# Patient Record
Sex: MALE | Race: WHITE
[De-identification: names, ages, dates, MRNs, and addresses within clinical notes are randomized per-mention and may not be internally consistent; named-entity substitution may affect disease eponyms.]

---

## 2017-03-22 ENCOUNTER — HOSPITAL ENCOUNTER (OUTPATIENT)
Dept: HOSPITAL 56 - MW.CHRC | Age: 37
End: 2017-03-22
Attending: FAMILY MEDICINE
Payer: COMMERCIAL

## 2017-03-22 DIAGNOSIS — R07.9: Primary | ICD-10-CM

## 2017-07-24 ENCOUNTER — HOSPITAL ENCOUNTER (OUTPATIENT)
Dept: HOSPITAL 56 - MW.ED | Age: 37
Setting detail: OBSERVATION
LOS: 1 days | Discharge: HOME | End: 2017-07-25
Attending: INTERNAL MEDICINE | Admitting: INTERNAL MEDICINE
Payer: COMMERCIAL

## 2017-07-24 DIAGNOSIS — R10.12: ICD-10-CM

## 2017-07-24 DIAGNOSIS — K29.70: ICD-10-CM

## 2017-07-24 DIAGNOSIS — Z79.899: ICD-10-CM

## 2017-07-24 DIAGNOSIS — F41.8: ICD-10-CM

## 2017-07-24 DIAGNOSIS — R07.89: Primary | ICD-10-CM

## 2017-07-24 DIAGNOSIS — F10.10: ICD-10-CM

## 2017-07-24 LAB
CHLORIDE SERPL-SCNC: 102 MMOL/L (ref 98–110)
SODIUM SERPL-SCNC: 139 MMOL/L (ref 136–146)

## 2017-07-24 PROCEDURE — 85025 COMPLETE CBC W/AUTO DIFF WBC: CPT

## 2017-07-24 PROCEDURE — 80053 COMPREHEN METABOLIC PANEL: CPT

## 2017-07-24 PROCEDURE — G0480 DRUG TEST DEF 1-7 CLASSES: HCPCS

## 2017-07-24 PROCEDURE — G0378 HOSPITAL OBSERVATION PER HR: HCPCS

## 2017-07-24 PROCEDURE — 71010: CPT

## 2017-07-24 PROCEDURE — 96374 THER/PROPH/DIAG INJ IV PUSH: CPT

## 2017-07-24 PROCEDURE — 93005 ELECTROCARDIOGRAM TRACING: CPT

## 2017-07-24 PROCEDURE — 84484 ASSAY OF TROPONIN QUANT: CPT

## 2017-07-24 PROCEDURE — 83690 ASSAY OF LIPASE: CPT

## 2017-07-24 PROCEDURE — 83036 HEMOGLOBIN GLYCOSYLATED A1C: CPT

## 2017-07-24 PROCEDURE — 80061 LIPID PANEL: CPT

## 2017-07-24 PROCEDURE — C9113 INJ PANTOPRAZOLE SODIUM, VIA: HCPCS

## 2017-07-24 PROCEDURE — 99285 EMERGENCY DEPT VISIT HI MDM: CPT

## 2017-07-24 PROCEDURE — 96375 TX/PRO/DX INJ NEW DRUG ADDON: CPT

## 2017-07-24 PROCEDURE — 36415 COLL VENOUS BLD VENIPUNCTURE: CPT

## 2017-07-24 PROCEDURE — 96361 HYDRATE IV INFUSION ADD-ON: CPT

## 2017-07-24 PROCEDURE — 96376 TX/PRO/DX INJ SAME DRUG ADON: CPT

## 2017-07-24 RX ADMIN — SUCRALFATE SCH GM: 1 SUSPENSION ORAL at 12:02

## 2017-07-24 RX ADMIN — SODIUM CHLORIDE SCH MG: 9 INJECTION, SOLUTION INTRAVENOUS at 11:57

## 2017-07-24 RX ADMIN — SODIUM CHLORIDE SCH MLS/HR: 9 INJECTION, SOLUTION INTRAMUSCULAR; INTRAVENOUS; SUBCUTANEOUS at 11:58

## 2017-07-24 RX ADMIN — FOLIC ACID SCH MG: 5 INJECTION, SOLUTION INTRAMUSCULAR; INTRAVENOUS; SUBCUTANEOUS at 11:59

## 2017-07-24 RX ADMIN — SUCRALFATE SCH GM: 1 SUSPENSION ORAL at 20:58

## 2017-07-24 RX ADMIN — SODIUM CHLORIDE SCH MLS/HR: 9 INJECTION, SOLUTION INTRAMUSCULAR; INTRAVENOUS; SUBCUTANEOUS at 23:54

## 2017-07-24 RX ADMIN — SUCRALFATE SCH GM: 1 SUSPENSION ORAL at 16:34

## 2017-07-24 NOTE — EDM.PDOC
[id number]   6d


CHEST PAIN


Time Seen by Provider: 07/24/17 08:25


Source of Information: Reports: Patient


History Limitations: Reports: No Limitations





- History of Present Illness


INITIAL COMMENTS - FREE TEXT/NARRATIVE: 





History of present illness:


[]Patient states he is an alcoholic and has been drinking heavily for the last 

2 months. This morning at 4:00 he woke up with epigastric pain radiating to his 

chest he states he "thinks" he had left arm tingling but states he probably was 

imagining this as he was working himself up into an anxiety attack. Patient 

also complains of left-sided mid abdominal pain. Denies any fevers, chills, 

shortness of breath, diarrhea or vomiting.





Review of systems: 


As per history of present illness and below otherwise all systems reviewed and 

negative.





Past medical history: 


As per history of present illness and as reviewed below otherwise 

noncontributory.





Surgical history: 


As per history of present illness and as reviewed below otherwise 

noncontributory.





Social history: 


No reported history of drug or alcohol abuse.





Family history: 


As per history of present illness and as reviewed below otherwise 

noncontributory.





Physical exam:


General: Well developed, well nourished in NAD


HEENT: Atraumatic, normocephalic, pupils reactive, negative for conjunctival 

pallor or scleral icterus, mucous membranes moist, throat clear, neck supple, 

nontender, trachea midline.


Lungs: Clear to auscultation, breath sounds equal bilaterally, chest nontender.


Heart: S1S2, regular, negative for clicks, rubs, or JVD.


Abdomen: Soft, nondistended, nontender. Negative for masses or 

hepatosplenomegaly. Negative for costovertebral tenderness.


Pelvis: Stable nontender.


Genitourinary: Deferred.


Rectal: Deferred.


Extremities: Atraumatic, negative for cords or calf pain. Neurovascular 

unremarkable.


Neuro: Awake, alert, oriented. Cranial nerves II through XII unremarkable. 

Cerebellum unremarkable. Motor and sensory unremarkable throughout. Exam 

nonfocal.





Diagnostics:


[]EKG shows no acute ischemia





Therapeutics:


[]Patient was given Pepcid, aspirin alleviated chest pain





Impression: 


[]Chest pain, alcohol intoxication and abdominal





Plan:


[]Admit for observation and rule out MI DT precautions





Definitive disposition and diagnosis as appropriate pending reevaluation and 

review of above.


  ** Left Abdomen


Pain Score (Numeric/FACES): 7





  ** chest


Pain Score (Numeric/FACES): 7





- Related Data


 Allergies











Allergy/AdvReac Type Severity Reaction Status Date / Time


 


No Known Allergies Allergy   Verified 07/24/17 08:29











Home Meds: 


 Home Meds





. [No Known Home Meds]  10/31/16 [History]











Past Medical History





- Past Health History


Medical/Surgical History: Denies Medical/Surgical History





Social & Family History





- Family History


Family Medical History: Noncontributory





- Tobacco Use


Smoking Status *Q: Current Every Day Smoker


Years of Tobacco use: 20


Packs/Tins Daily: 1





- Caffeine Use


Caffeine Use: Reports: Coffee


Caffeine Use Comment: 3 cups daily





- Recreational Drug Use


Recreational Drug Use: No





ED ROS GENERAL





- Review of Systems


Review Of Systems: See Below (See history of present illness)





ED EXAM, GENERAL





- Physical Exam


Exam: See Below (See history of present illness)





Course





- Vital Signs


Last Recorded V/S: 


 Last Vital Signs











Temp  36.2 C   07/24/17 10:10


 


Pulse  83   07/24/17 10:10


 


Resp  18   07/24/17 10:10


 


BP  137/88   07/24/17 10:10


 


Pulse Ox  96   07/24/17 10:10














- Orders/Labs/Meds


Orders: 


 Active Orders 24 hr











 Category Date Time Status


 


 Patient Status [ADT] Stat ADT  07/24/17 09:51 Active


 


 EKG Documentation Completion [RC] STAT Care  07/24/17 08:33 Active


 


 Saline Lock Insert [OM.PC] Stat Oth  07/24/17 08:33 Ordered








 Medication Orders





Acetaminophen (Tylenol)  650 mg PO Q4H PRN


   PRN Reason: Pain


Fluoxetine HCl (Prozac)  20 mg PO DAILY [facility]


Folic Acid (Folic Acid)  1 mg SUBCUT DAILY [facility]


Pantoprazole Sodium 40 mg/ (Sodium Chloride)  10 mls @ 300 mls/hr IVPUSH Q12H 

[facility]


Lorazepam (Ativan)  0 mg IVPUSH Q4H PRN; Protocol


   PRN Reason: CIWAA protocol


Lorazepam (Ativan)  1 mg PO BID PRN


   PRN Reason: Anxiety


Ondansetron HCl (Zofran)  4 mg IVPUSH Q4H PRN


   PRN Reason: Nausea


Sucralfate (Carafate)  1 gm PO QIDACANDBED [facility]


Thiamine HCl (Vitamin B-1)  100 mg IV DAILY [facility]








Labs: 


 Laboratory Tests











  07/24/17 07/24/17 07/24/17 Range/Units





  08:25 08:25 08:25 


 


WBC  6.94    (4.0-11.0)  K/uL


 


RBC  5.23    (4.50-5.90)  M/uL


 


Hgb  17.0    (13.0-17.0)  g/dL


 


Hct  49.9    (38.0-50.0)  %


 


MCV  95.4    (80.0-98.0)  fL


 


MCH  32.5 H    (27.0-32.0)  pg


 


MCHC  34.1    (31.0-37.0)  g/dL


 


RDW Std Deviation  48.4    (28.0-62.0)  fl


 


RDW Coeff of Bakari  14    (11.0-15.0)  %


 


Plt Count  251    (150-400)  K/uL


 


MPV  10.20    (7.40-12.00)  fL


 


Neut % (Auto)  52.7    (48.0-80.0)  %


 


Lymph % (Auto)  35.9    (16.0-40.0)  %


 


Mono % (Auto)  6.6    (0.0-15.0)  %


 


Eos % (Auto)  4.2    (0.0-7.0)  %


 


Baso % (Auto)  0.6    (0.0-1.5)  %


 


Neut # (Auto)  3.7    (1.4-5.7)  K/uL


 


Lymph # (Auto)  2.5 H    (0.6-2.4)  K/uL


 


Mono # (Auto)  0.5    (0.0-0.8)  K/uL


 


Eos # (Auto)  0.3    (0.0-0.7)  K/uL


 


Baso # (Auto)  0.0    (0.0-0.1)  K/uL


 


Nucleated RBC %  0.0    /100WBC


 


Nucleated RBCs #  0    K/uL


 


Sodium   139   (136-146)  mmol/L


 


Potassium   4.0   (3.5-5.1)  mmol/L


 


Chloride   102   ()  mmol/L


 


Carbon Dioxide   26   (21-31)  mmol/L


 


BUN   10   (6.0-23.0)  mg/dL


 


Creatinine   0.8   (0.6-1.5)  mg/dL


 


Est Cr Clr Drug Dosing   130.11   mL/min


 


Estimated GFR (MDRD)   > 60.0   ml/min


 


Glucose   86   ()  mg/dL


 


Calcium   9.2   (8.8-10.8)  mg/dL


 


Total Bilirubin   0.7   (0.1-1.5)  mg/dL


 


AST   41 H   (5-40)  IU/L


 


ALT   27   (8-54)  IU/L


 


Alkaline Phosphatase   67   ()  


 


Troponin I    < 0.10  (0.0-0.29)  NG/ML


 


Total Protein   7.4   (6.0-8.0)  g/dL


 


Albumin   4.6   (3.5-5.0)  g/dL


 


Globulin   2.8   (2.0-3.5)  g/dL


 


Albumin/Globulin Ratio   1.6   (1.3-2.8)  


 


Triglycerides     ()  mg/dL


 


Cholesterol     (131-240)  mg/dL


 


LDL Cholesterol, Calc     ()  mg/dL


 


VLDL Cholesterol     (5-55)  mg/dL


 


HDL Cholesterol     (40-80)  mg/dL


 


Cholesterol/HDL Ratio     (3.3-6.0)  


 


Lipase   29   (7-80)  U/L


 


Ethyl Alcohol     mg/dL














  07/24/17 07/24/17 Range/Units





  08:25 08:25 


 


WBC    (4.0-11.0)  K/uL


 


RBC    (4.50-5.90)  M/uL


 


Hgb    (13.0-17.0)  g/dL


 


Hct    (38.0-50.0)  %


 


MCV    (80.0-98.0)  fL


 


MCH    (27.0-32.0)  pg


 


MCHC    (31.0-37.0)  g/dL


 


RDW Std Deviation    (28.0-62.0)  fl


 


RDW Coeff of Bakari    (11.0-15.0)  %


 


Plt Count    (150-400)  K/uL


 


MPV    (7.40-12.00)  fL


 


Neut % (Auto)    (48.0-80.0)  %


 


Lymph % (Auto)    (16.0-40.0)  %


 


Mono % (Auto)    (0.0-15.0)  %


 


Eos % (Auto)    (0.0-7.0)  %


 


Baso % (Auto)    (0.0-1.5)  %


 


Neut # (Auto)    (1.4-5.7)  K/uL


 


Lymph # (Auto)    (0.6-2.4)  K/uL


 


Mono # (Auto)    (0.0-0.8)  K/uL


 


Eos # (Auto)    (0.0-0.7)  K/uL


 


Baso # (Auto)    (0.0-0.1)  K/uL


 


Nucleated RBC %    /100WBC


 


Nucleated RBCs #    K/uL


 


Sodium    (136-146)  mmol/L


 


Potassium    (3.5-5.1)  mmol/L


 


Chloride    ()  mmol/L


 


Carbon Dioxide    (21-31)  mmol/L


 


BUN    (6.0-23.0)  mg/dL


 


Creatinine    (0.6-1.5)  mg/dL


 


Est Cr Clr Drug Dosing    mL/min


 


Estimated GFR (MDRD)    ml/min


 


Glucose    ()  mg/dL


 


Calcium    (8.8-10.8)  mg/dL


 


Total Bilirubin    (0.1-1.5)  mg/dL


 


AST    (5-40)  IU/L


 


ALT    (8-54)  IU/L


 


Alkaline Phosphatase    ()  


 


Troponin I    (0.0-0.29)  NG/ML


 


Total Protein    (6.0-8.0)  g/dL


 


Albumin    (3.5-5.0)  g/dL


 


Globulin    (2.0-3.5)  g/dL


 


Albumin/Globulin Ratio    (1.3-2.8)  


 


Triglycerides   84  ()  mg/dL


 


Cholesterol   228  (131-240)  mg/dL


 


LDL Cholesterol, Calc   107  ()  mg/dL


 


VLDL Cholesterol   17  (5-55)  mg/dL


 


HDL Cholesterol   104 H  (40-80)  mg/dL


 


Cholesterol/HDL Ratio   2.2 L  (3.3-6.0)  


 


Lipase    (7-80)  U/L


 


Ethyl Alcohol  141.7   mg/dL











Meds: 


Medications











Generic Name Dose Route Start Last Admin





  Trade Name Freq  PRN Reason Stop Dose Admin


 


Acetaminophen  650 mg  07/24/17 10:54  





  Tylenol  PO   





  Q4H PRN   





  Pain   


 


Fluoxetine HCl  20 mg  07/24/17 11:30  





  Prozac  PO   





  DAILY [facility]   


 


Folic Acid  1 mg  07/24/17 11:00  





  Folic Acid  SUBCUT   





  DAILY [facility]   


 


Pantoprazole Sodium 40 mg/  10 mls @ 300 mls/hr  07/24/17 11:30  





  Sodium Chloride  IVPUSH   





  Q12H [facility]   


 


Lorazepam  0 mg  07/24/17 10:54  





  Ativan  IVPUSH   





  Q4H PRN   





  CIWAA protocol   





  Protocol   


 


Lorazepam  1 mg  07/24/17 11:27  





  Ativan  PO   





  BID PRN   





  Anxiety   


 


Ondansetron HCl  4 mg  07/24/17 10:54  





  Zofran  IVPUSH   





  Q4H PRN   





  Nausea   


 


Sucralfate  1 gm  07/24/17 11:30  





  Carafate  PO   





  QIDACANDBED [facility]   


 


Thiamine HCl  100 mg  07/24/17 11:00  





  Vitamin B-1  IV   





  DAILY [facility]   














Discontinued Medications














Generic Name Dose Route Start Last Admin





  Trade Name Freq  PRN Reason Stop Dose Admin


 


Aspirin  324 mg  07/24/17 09:47  07/24/17 09:59





  Aspirin  PO  07/24/17 09:48  324 mg





  ONETIME ONE   Administration


 


Al Hydroxide/Mg Hydroxide 15  0 ml  07/24/17 09:48  07/24/17 09:59





ml/ Metoclopramide HCl 5 mg/  PO  07/24/17 09:49  25 each





Lidocaine HCl 5 ml  ONETIME ONE   Administration


 


Famotidine  20 mg  07/24/17 08:33  07/24/17 08:42





  Pepcid  IVPUSH  07/24/17 08:34  20 mg





  ONETIME ONE   Administration


 


Sodium Chloride  1,000 mls @ 999 mls/hr  07/24/17 08:33  07/24/17 08:42





  Normal Saline  IV  07/24/17 09:33  999 mls/hr





  .Bolus ONE   Administration














Departure





- Departure


Time of Disposition: 10:05


Disposition: Admitted As Inpatient 68


Clinical Impression: 


 Chest pain, Alcohol abuse





Abdominal pain


Qualifiers:


 Abdominal location: left upper quadrant Qualified Code(s): R10.12 - Left upper 

quadrant pain








- Discharge Information





- My Orders


Last 24 Hours: 


My Active Orders





07/24/17 08:33


EKG Documentation Completion [RC] STAT 


Saline Lock Insert [OM.PC] Stat 





07/24/17 09:51


Patient Status [ADT] Stat 














- Assessment/Plan


Last 24 Hours: 


My Active Orders





07/24/17 08:33


EKG Documentation Completion [RC] STAT 


Saline Lock Insert [OM.PC] Stat 





07/24/17 09:51


Patient Status [ADT] Stat
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
## 2017-07-25 VITALS — SYSTOLIC BLOOD PRESSURE: 135 MMHG | DIASTOLIC BLOOD PRESSURE: 87 MMHG

## 2017-07-25 RX ADMIN — FOLIC ACID SCH MG: 5 INJECTION, SOLUTION INTRAMUSCULAR; INTRAVENOUS; SUBCUTANEOUS at 08:21

## 2017-07-25 RX ADMIN — SUCRALFATE SCH GM: 1 SUSPENSION ORAL at 10:29

## 2017-07-25 RX ADMIN — SODIUM CHLORIDE SCH MLS/HR: 9 INJECTION, SOLUTION INTRAMUSCULAR; INTRAVENOUS; SUBCUTANEOUS at 10:31

## 2017-07-25 RX ADMIN — SUCRALFATE SCH GM: 1 SUSPENSION ORAL at 06:38

## 2017-07-25 RX ADMIN — SODIUM CHLORIDE SCH MG: 9 INJECTION, SOLUTION INTRAVENOUS at 08:21
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
## 2017-09-18 ENCOUNTER — HOSPITAL ENCOUNTER (EMERGENCY)
Dept: HOSPITAL 56 - MW.ED | Age: 37
Discharge: HOME | End: 2017-09-18
Payer: COMMERCIAL

## 2017-09-18 VITALS — SYSTOLIC BLOOD PRESSURE: 133 MMHG | DIASTOLIC BLOOD PRESSURE: 96 MMHG

## 2017-09-18 DIAGNOSIS — F17.210: ICD-10-CM

## 2017-09-18 DIAGNOSIS — F32.9: ICD-10-CM

## 2017-09-18 DIAGNOSIS — Z13.89: Primary | ICD-10-CM

## 2017-09-18 NOTE — EDM.PDOC
ED HPI GENERAL MEDICAL PROBLEM





- General


Chief Complaint: Behavioral/Psych


Stated Complaint: SUICIDE


Time Seen by Provider: 09/18/17 20:43





- History of Present Illness


INITIAL COMMENTS - FREE TEXT/NARRATIVE: 





HISTORY AND PHYSICAL:





History of present illness:


Patient 36-year-old white male history of depression was brought in by police 

when his brother-in-law called regarding concerns of patient's depressive 

episode on arrival here patient states he has no intention of suicide he states 

he has been upsetting is going through divorce he also states he's been abusing 

alcohol as of late and that's something that he knows he needs. He does have a 

psychiatrist locally in a private medical doctor he is cooperative polite and 

remains adamant inconsistent about having no intention of self-harm or harm 

anybody else





Review of systems: 


As per history of present illness and below otherwise all systems reviewed and 

negative.





Past medical history: 


As per history of present illness and as reviewed below otherwise 

noncontributory.





Surgical history: 


As per history of present illness and as reviewed below otherwise 

noncontributory.





Social history: 


No reported history of drug or alcohol abuse.





Family history: 


As per history of present illness and as reviewed below otherwise 

noncontributory.





Physical exam:


HEENT: Atraumatic, normocephalic, pupils reactive, negative for conjunctival 

pallor or scleral icterus, mucous membranes moist, throat clear, neck supple, 

nontender, trachea midline.


Lungs: Clear to auscultation, breath sounds equal bilaterally, chest nontender.


Heart: S1S2, regular, negative for clicks, rubs, or JVD.


Abdomen: Soft, nondistended, nontender. Negative for masses or 

hepatosplenomegaly. Negative for costovertebral tenderness.


Pelvis: Stable nontender.


Genitourinary: Deferred.


Rectal: Deferred.


Extremities: Atraumatic, negative for cords or calf pain. Neurovascular 

unremarkable.


Neuro: Awake, alert, oriented. Cranial nerves II through XII unremarkable. 

Cerebellum unremarkable. Motor and sensory unremarkable throughout. Exam 

nonfocal.





Diagnostics:


None





Therapeutics:


None





Impression: 


#1 medical screening exam #2 history of depression





Definitive disposition and diagnosis as appropriate pending reevaluation and 

review of above.





- Related Data


 Allergies











Allergy/AdvReac Type Severity Reaction Status Date / Time


 


No Known Allergies Allergy   Verified 07/24/17 08:29











Home Meds: 


 Home Meds





FLUoxetine [PROzac] 20 mg PO DAILY #30 cap 07/25/17 [Rx]


Pantoprazole Sodium [Protonix] 40 mg PO BID #60 tablet. 07/25/17 [Rx]


Sucralfate [Carafate] 1 gm PO QIDACANDBED #120 tablet 07/25/17 [Rx]











Past Medical History





- Past Health History


Medical/Surgical History: Denies Medical/Surgical History


Cardiovascular History: Reports: None.  Denies: Blood Clots/VTE/DVT, High 

Cholesterol, Hypertension, MI


Respiratory History: Reports: Asthma (childhood)


Gastrointestinal History: Reports: None.  Denies: GERD, GI Bleed


Psychiatric History: Reports: Addiction, Anxiety, Depression


Endocrine/Metabolic History: Reports: None.  Denies: Diabetes, Type II, 

Hypothyroidism





Social & Family History





- Family History


Family Medical History: Noncontributory


Cardiac: Reports: MI


Other Cardiac Family History: Father had heart attack in 2013 with stents 

placed.





- Tobacco Use


Smoking Status *Q: Current Every Day Smoker


Years of Tobacco use: 22


Packs/Tins Daily: 0.2


Used Tobacco, but Quit: No


Month Tobacco Last Used: July


Second Hand Smoke Exposure: No





- Caffeine Use


Caffeine Use: Reports: Coffee, Tea


Caffeine Use Comment: 3 cups daily





- Alcohol Use


Days Per Week of Alcohol Use: 7


Number of Drinks Per Day: 9


Total Drinks Per Week: 63





- Recreational Drug Use


Recreational Drug Use: No





- Living Situation & Occupation


Living situation: Reports: 


Occupation: Employed





ED ROS GENERAL





- Review of Systems


Review Of Systems: ROS reveals no pertinent complaints other than HPI.





ED EXAM, GENERAL





- Physical Exam


Exam: See Below (See dictation)





Course





- Orders/Labs/Meds


Orders: 





 Active Orders 24 hr











 Category Date Time Status


 


 EKG Documentation Completion [RC] STAT Care  09/18/17 20:43 Active


 


 CBC WITH AUTO DIFF [HEME] Stat Lab  09/18/17 20:43 Ordered


 


 COMPREHENSIVE METABOLIC PN,CMP [CHEM] Stat Lab  09/18/17 20:43 Ordered


 


 DRUG SCREEN, URINE [URCHEM] Stat Lab  09/18/17 20:43 Uncollected


 


 ETHANOL BLOOD MEDICAL [CHEM] Stat Lab  09/18/17 20:43 Ordered














Departure





- Departure


Time of Disposition: 20:55


Disposition: Home, Self-Care 01


Condition: Good


Clinical Impression: 


 Encounter for medical screening examination, History of depression








- Discharge Information


Additional Instructions: 


The following information is given to patients seen in the emergency department 

who are being discharged to home. This information is to outline your options 

for follow-up care. We provide all patients seen in our emergency department 

with a follow-up referral.





The need for follow-up, as well as the timing and circumstances, are variable 

depending upon the specifics of your emergency department visit.





If you don't have a primary care physician on staff, we will provide you with a 

referral. We always advise you to contact your personal physician following an 

emergency department visit to inform them of the circumstance of the visit and 

for follow-up with them and/or the need for any referrals to a consulting 

specialist.





The emergency department will also refer you to a specialist when appropriate. 

This referral assures that you have the opportunity for followup care with a 

specialist. All of these measure are taken in an effort to provide you with 

optimal care, which includes your followup.





Under all circumstances we always encourage you to contact your private 

physician who remains a resource for coordinating  your care. When calling for 

followup care, please make the office aware that this follow-up is from your 

recent emergency room visit. If for any reason you are refused follow-up, 

please contact the [facility] emergency department at [phone] 

and asked to speak to the emergency department charge nurse.


























Follow private medical doctor and psychiatrist as discussed return as needed as 

discussed





- My Orders


Last 24 Hours: 





My Active Orders





09/18/17 20:43


EKG Documentation Completion [RC] STAT 


CBC WITH AUTO DIFF [HEME] Stat 


COMPREHENSIVE METABOLIC PN,CMP [CHEM] Stat 


DRUG SCREEN, URINE [URCHEM] Stat 


ETHANOL BLOOD MEDICAL [CHEM] Stat 














- Assessment/Plan


Last 24 Hours: 





My Active Orders





09/18/17 20:43


EKG Documentation Completion [RC] STAT 


CBC WITH AUTO DIFF [HEME] Stat 


COMPREHENSIVE METABOLIC PN,CMP [CHEM] Stat 


DRUG SCREEN, URINE [URCHEM] Stat 


ETHANOL BLOOD MEDICAL [CHEM] Stat

## 2018-06-03 ENCOUNTER — HOSPITAL ENCOUNTER (EMERGENCY)
Dept: HOSPITAL 56 - MW.ED | Age: 38
Discharge: HOME | End: 2018-06-03
Payer: COMMERCIAL

## 2018-06-03 VITALS — DIASTOLIC BLOOD PRESSURE: 100 MMHG | SYSTOLIC BLOOD PRESSURE: 143 MMHG

## 2018-06-03 DIAGNOSIS — J45.909: ICD-10-CM

## 2018-06-03 DIAGNOSIS — R10.12: ICD-10-CM

## 2018-06-03 DIAGNOSIS — F41.9: Primary | ICD-10-CM

## 2018-06-03 DIAGNOSIS — F32.9: ICD-10-CM

## 2018-06-03 LAB
CHLORIDE SERPL-SCNC: 98 MMOL/L (ref 98–107)
SODIUM SERPL-SCNC: 136 MMOL/L (ref 136–148)

## 2018-06-03 PROCEDURE — 83690 ASSAY OF LIPASE: CPT

## 2018-06-03 PROCEDURE — 96374 THER/PROPH/DIAG INJ IV PUSH: CPT

## 2018-06-03 PROCEDURE — 80053 COMPREHEN METABOLIC PANEL: CPT

## 2018-06-03 PROCEDURE — 99284 EMERGENCY DEPT VISIT MOD MDM: CPT

## 2018-06-03 PROCEDURE — 81001 URINALYSIS AUTO W/SCOPE: CPT

## 2018-06-03 PROCEDURE — 85025 COMPLETE CBC W/AUTO DIFF WBC: CPT

## 2018-06-03 PROCEDURE — 36415 COLL VENOUS BLD VENIPUNCTURE: CPT

## 2018-06-03 PROCEDURE — 82150 ASSAY OF AMYLASE: CPT

## 2018-06-03 PROCEDURE — 96361 HYDRATE IV INFUSION ADD-ON: CPT

## 2018-10-03 ENCOUNTER — HOSPITAL ENCOUNTER (EMERGENCY)
Dept: HOSPITAL 56 - MW.ED | Age: 38
Discharge: HOME | End: 2018-10-03
Payer: SELF-PAY

## 2018-10-03 VITALS — DIASTOLIC BLOOD PRESSURE: 100 MMHG | SYSTOLIC BLOOD PRESSURE: 164 MMHG

## 2018-10-03 DIAGNOSIS — R51: Primary | ICD-10-CM

## 2018-10-03 DIAGNOSIS — Z79.899: ICD-10-CM

## 2018-10-03 DIAGNOSIS — F17.210: ICD-10-CM

## 2018-10-03 DIAGNOSIS — F41.9: ICD-10-CM

## 2018-10-03 DIAGNOSIS — F32.9: ICD-10-CM

## 2018-10-03 PROCEDURE — 96372 THER/PROPH/DIAG INJ SC/IM: CPT

## 2018-10-03 PROCEDURE — 99283 EMERGENCY DEPT VISIT LOW MDM: CPT

## 2018-10-03 NOTE — EDM.PDOC
ED HPI GENERAL MEDICAL PROBLEM





- General


Chief Complaint: Headache


Stated Complaint: HEADACHE


Time Seen by Provider: 10/03/18 15:43


Source of Information: Reports: Patient


History Limitations: Reports: No Limitations





- History of Present Illness


INITIAL COMMENTS - FREE TEXT/NARRATIVE: 





HISTORY AND PHYSICAL:





History of present illness:


Patient is a 38 showed male who presents to the emergency room today with 

complaints of sinus pressure and headache pain. He states for the past 1-1/2 

weeks he has had a sinus infection with copious amounts of nasal drainage, 

pressure and pain. Over the past several days his pain has been more localized 

to the left side of his temple/eye and is concerned he need antibiotics.


He denies any fever, chills, chest pain, shortness of breath or cough. Denies 

any abdominal pain, nausea, vomiting, diarrhea, constipation


Review of systems: 


As per history of present illness and below otherwise all systems reviewed and 

negative.





Past medical history: 


As per history of present illness and as reviewed below otherwise 

noncontributory.





Surgical history: 


As per history of present illness and as reviewed below otherwise 

noncontributory.





Social history: 


No reported history of drug or alcohol abuse.





Family history: 


As per history of present illness and as reviewed below otherwise 

noncontributory.





Physical exam:


General: Well-developed and well-nourished 38 showed male. Alert and oriented. 

Nontoxic appearing and in no acute distress.


HEENT: Atraumatic, normocephalic, pupils equal and reactive bilaterally, 

negative for conjunctival pallor or scleral icterus, mucous membranes moist, 

left maxillary/frontal sinus pressure/pain, TM normal bilaterally, throat clear

, neck supple, nontender, trachea midline. No drooling or trismus noted. No 

meningeal signs


Lungs: Clear to auscultation, breath sounds equal bilaterally, chest nontender.


Heart: S1S2, regular rate and rhythm without overt murmur


Abdomen: Soft, nondistended, nontender. Negative for masses or 

hepatosplenomegaly. Negative for costovertebral tenderness.


Pelvis: Stable nontender.


Genitourinary: Deferred.


Rectal: Deferred.


Skin: Intact, warm, dry. No lesions or rashes noted.


Extremities: Atraumatic, negative for cords or calf pain. Neurovascular 

unremarkable.


Neuro: Awake, alert, oriented. Cranial nerves II through XII unremarkable. 

Cerebellum unremarkable. Motor and sensory unremarkable throughout. Exam 

nonfocal.





Notes:


Patient declines the need for medication for his headache pain. He is willing 

to receive Toradol IM. He would like an antibiotic for his sinusitis, we'll 

treat with Augmentin. Supportive care measures were reviewed and discussed. She 

voices understanding and is agreeable to plan of care. Denies any further 

questions or concerns at this time.





Diagnostics:


None





Therapeutics:


Toradol





Prescription:


Augmentin





Impression: 


Sinus Headache





Plan:


1. Take your medication as prescribed.


2. Tylenol and/or ibuprofen as needed for pain management.


3. Follow-up with your primary care provider in the next 1-2 days. Return to 

the ED as needed and as discussed.





Definitive disposition and diagnosis as appropriate pending reevaluation and 

review of above.








- Related Data


 Allergies











Allergy/AdvReac Type Severity Reaction Status Date / Time


 


No Known Allergies Allergy   Verified 10/03/18 15:49











Home Meds: 


 Home Meds





Amoxicillin/Clavulanate K [Augmentin 875-125 MG] 1 tab PO BID 10 Days #20 

tablet 10/03/18 [Rx]


Escitalopram Oxalate [Lexapro]  10/03/18 [History]


LORazepam [Ativan]  10/03/18 [History]











Past Medical History





- Past Health History


Medical/Surgical History: Denies Medical/Surgical History


Cardiovascular History: Reports: None


Respiratory History: Reports: Asthma


Gastrointestinal History: Reports: None


Psychiatric History: Reports: Addiction, Anxiety, Depression


Endocrine/Metabolic History: Reports: None





- Infectious Disease History


Infectious Disease History: Reports: Chicken Pox





Social & Family History





- Family History


Family Medical History: Noncontributory


Cardiac: Reports: MI


Other Cardiac Family History: Father had heart attack in 2013 with stents 

placed.





- Tobacco Use


Smoking Status *Q: Current Every Day Smoker


Years of Tobacco use: 23


Packs/Tins Daily: 0.6





- Caffeine Use


Caffeine Use: Reports: Coffee


Caffeine Use Comment: 3 cups daily





- Alcohol Use


Days Per Week of Alcohol Use: 3


Number of Drinks Per Day: 6


Total Drinks Per Week: 18





- Recreational Drug Use


Recreational Drug Use: No





- Living Situation & Occupation


Living situation: Reports: 


Occupation: Employed





ED ROS GENERAL





- Review of Systems


Review Of Systems: ROS reveals no pertinent complaints other than HPI.





- Physical Exam


Exam: See Below (See dictation)





Course





- Vital Signs


Last Recorded V/S: 


 Last Vital Signs











Temp  98.3 F   10/03/18 15:46


 


Pulse  122 H  10/03/18 15:46


 


Resp  16   10/03/18 15:46


 


BP  164/100 H  10/03/18 15:46


 


Pulse Ox  97   10/03/18 15:46














- Orders/Labs/Meds


Meds: 


Medications














Discontinued Medications














Generic Name Dose Route Start Last Admin





  Trade Name Tim  PRN Reason Stop Dose Admin


 


Ketorolac Tromethamine  60 mg  10/03/18 16:29  





  Toradol  IM  10/03/18 16:30  





  ONETIME ONE   





     





     





     





     














Departure





- Departure


Time of Disposition: 16:30


Disposition: Home, Self-Care 01


Clinical Impression: 


 Sinus headache








- Discharge Information


Prescriptions: 


Amoxicillin/Clavulanate K [Augmentin 875-125 MG] 1 tab PO BID 10 Days #20 tablet


Instructions:  Sinus Headache, Easy-to-Read


Referrals: 


Surya Campuzano MD [Primary Care Provider] - 


Forms:  ED Department Discharge


Additional Instructions: 


The following information is given to patients seen in the emergency department 

who are being discharged to home. This information is to outline your options 

for follow-up care. We provide all patients seen in our emergency department 

with a follow-up referral.





The need for follow-up, as well as the timing and circumstances, are variable 

depending upon the specifics of your emergency department visit.





If you don't have a primary care physician on staff, we will provide you with a 

referral. We always advise you to contact your personal physician following an 

emergency department visit to inform them of the circumstance of the visit and 

for follow-up with them and/or the need for any referrals to a consulting 

specialist.





The emergency department will also refer you to a specialist when appropriate. 

This referral assures that you have the opportunity for follow-up care with a 

specialist. All of these measure are taken in an effort to provide you with 

optimal care, which includes your follow-up.





Under all circumstances we always encourage you to contact your private 

physician who remains a resource for coordinating your care. When calling for 

follow-up care, please make the office aware that this follow-up is from your 

recent emergency room visit. If for any reason you are refused follow-up, 

please contact the [facility] Emergency 

Department at [phone] and asked to speak to the emergency department 

charge nurse.





[facility]


Primary Care


[address] [place] [id number]


Phone: [phone]


Fax: [phone]





1. Take your medication as prescribed.


2. Tylenol and/or ibuprofen as needed for pain management.


3. Follow-up with your primary care provider in the next 1-2 days. Return to 

the ED as needed and as discussed.

## 2020-02-08 ENCOUNTER — HOSPITAL ENCOUNTER (EMERGENCY)
Dept: HOSPITAL 56 - MW.ED | Age: 40
Discharge: HOME | End: 2020-02-08
Payer: COMMERCIAL

## 2020-02-08 VITALS — DIASTOLIC BLOOD PRESSURE: 59 MMHG | SYSTOLIC BLOOD PRESSURE: 110 MMHG | HEART RATE: 94 BPM

## 2020-02-08 DIAGNOSIS — J45.909: ICD-10-CM

## 2020-02-08 DIAGNOSIS — J06.9: Primary | ICD-10-CM

## 2020-02-08 DIAGNOSIS — R10.9: ICD-10-CM

## 2020-02-08 LAB
BUN SERPL-MCNC: 9 MG/DL (ref 7–18)
CHLORIDE SERPL-SCNC: 105 MMOL/L (ref 98–107)
CO2 SERPL-SCNC: 25.5 MMOL/L (ref 21–32)
GLUCOSE SERPL-MCNC: 125 MG/DL (ref 74–106)
LIPASE SERPL-CCNC: 197 U/L (ref 73–393)
POTASSIUM SERPL-SCNC: 4.3 MMOL/L (ref 3.5–5.1)
SODIUM SERPL-SCNC: 141 MMOL/L (ref 136–148)

## 2020-02-08 PROCEDURE — 36415 COLL VENOUS BLD VENIPUNCTURE: CPT

## 2020-02-08 PROCEDURE — 80305 DRUG TEST PRSMV DIR OPT OBS: CPT

## 2020-02-08 PROCEDURE — 83605 ASSAY OF LACTIC ACID: CPT

## 2020-02-08 PROCEDURE — 96361 HYDRATE IV INFUSION ADD-ON: CPT

## 2020-02-08 PROCEDURE — 81003 URINALYSIS AUTO W/O SCOPE: CPT

## 2020-02-08 PROCEDURE — 85025 COMPLETE CBC W/AUTO DIFF WBC: CPT

## 2020-02-08 PROCEDURE — 71046 X-RAY EXAM CHEST 2 VIEWS: CPT

## 2020-02-08 PROCEDURE — 96374 THER/PROPH/DIAG INJ IV PUSH: CPT

## 2020-02-08 PROCEDURE — 99284 EMERGENCY DEPT VISIT MOD MDM: CPT

## 2020-02-08 PROCEDURE — 87804 INFLUENZA ASSAY W/OPTIC: CPT

## 2020-02-08 PROCEDURE — 83690 ASSAY OF LIPASE: CPT

## 2020-02-08 PROCEDURE — 80053 COMPREHEN METABOLIC PANEL: CPT

## 2020-02-08 NOTE — EDM.PDOC
ED HPI GENERAL MEDICAL PROBLEM





- General


Chief Complaint: ENT Problem


Stated Complaint: FLU SYMPTOMS


Time Seen by Provider: 02/08/20 15:45


Source of Information: Reports: Patient


History Limitations: Reports: No Limitations





- History of Present Illness


INITIAL COMMENTS - FREE TEXT/NARRATIVE: 


HISTORY AND PHYSICAL:





History of present illness:


Patient is a 39-year-old male who presents to the emergency room with his 

significant other with concerns of the flu.  Patient states he has been 

coughing thick green sputum over the past several days.  He also states he has 

been having some left upper quadrant pain but says "I know it is my own doing".

  He is referring to his heavy alcohol use. States he drink alcohol daily.  

Patient denies any fever, chills, headache, change in vision, syncope or near 

syncope. Denies any chest pain, back pain, shortness of breath or cough. Denies 

any nausea, vomiting, diarrhea, constipation or dysuria. Has not noted any 

blood in urine or stool. Patient has been eating and drinking appropriately.





Review of systems: 


As per history of present illness and below otherwise all systems reviewed and 

negative.





Past medical history: 


As per history of present illness and as reviewed below otherwise 

noncontributory.





Surgical history: 


As per history of present illness and as reviewed below otherwise 

noncontributory.





Social history: 


See social history for further information





Family history: 


As per history of present illness and as reviewed below otherwise 

noncontributory.





Physical exam:


General: Well-developed and well-nourished 39-year-old male.  Alert and 

oriented.  Nontoxic-appearing and in no acute distress.


HEENT: Atraumatic, normocephalic, pupils equal and reactive bilaterally, 

negative for conjunctival pallor or scleral icterus, mucous membranes moist, 

TMs normal bilaterally, throat clear, neck supple, nontender, trachea midline. 

No drooling or trismus noted. No meningeal signs. No hot potato voice noted. 


Lungs: Clear to auscultation, breath sounds equal bilaterally, chest nontender.


Heart: S1S2, regular rate and rhythm without overt murmur


Abdomen: Soft, nondistended, nontender. Negative for masses or 

hepatosplenomegaly. Negative for costovertebral tenderness.


Skin: Intact, warm, dry. No lesions or rashes noted.


Extremities: Atraumatic, moves all extremities per self without difficulty or 

deficits, negative for cords or calf pain. Neurovascular unremarkable.


Neuro: Awake, alert, oriented. Cranial nerves II through XII unremarkable. 

Cerebellum unremarkable. Motor and sensory unremarkable throughout. Exam 

nonfocal.





Notes:


Physical examination is unremarkable with the exception he is tachycardic. 

Agreeable to lab work and IV fluids at this time. 


Negative CRX and influenza. No significant findings of labs. Vital signs have 

improved. Patient feels better after IV fluids and mediations. Supportive care 

measures were reviewed and discussed. Voices understanding and is agreeable to 

plan of care. Denies any further questions or concerns at this time.





Diagnostics:


CBC, CMP, UA, Lipase, CXR, Influenza





Therapeutics:


1 liter NS, toradol





Prescription:


None





Impression: 


Abdominal Pain


Viral URI





Plan:


1. Loup diet and advance as tolerated.  Small frequent sips of fluids to 

prevent dehydration.  


2.  Tylenol and/or ibuprofen as needed for pain management


3. Follow up with your primary care provider. Return to the ED as needed as 

discussed. 





Definitive disposition and diagnosis as appropriate pending reevaluation and 

review of above.





  ** Left Upper Abdomen


Pain Score (Numeric/FACES): 3





- Related Data


 Allergies











Allergy/AdvReac Type Severity Reaction Status Date / Time


 


No Known Allergies Allergy   Verified 02/08/20 15:59











Home Meds: 


 Home Meds





. [No Known Home Meds]  02/08/20 [History]











Past Medical History





- Past Health History


Medical/Surgical History: Denies Medical/Surgical History


Cardiovascular History: Reports: None


Respiratory History: Reports: Asthma


Gastrointestinal History: Reports: None


Psychiatric History: Reports: Addiction, Anxiety, Depression


Endocrine/Metabolic History: Reports: None





- Infectious Disease History


Infectious Disease History: Reports: Chicken Pox





Social & Family History





- Family History


Family Medical History: Noncontributory


Cardiac: Reports: MI


Other Cardiac Family History: Father had heart attack in 2013 with stents 

placed.





- Caffeine Use


Caffeine Use: Reports: Coffee


Caffeine Use Comment: 3 cups daily





- Living Situation & Occupation


Living situation: Reports: 


Occupation: Employed





ED ROS ENT





- Review of Systems


Review Of Systems: Comprehensive ROS is negative, except as noted in HPI.





ED EXAM, ENT





- Physical Exam


Exam: See Below (See dictation)





Course





- Vital Signs


Last Recorded V/S: 


 Last Vital Signs











Temp  98.4 F   02/08/20 17:18


 


Pulse  94   02/08/20 17:42


 


Resp  16   02/08/20 17:42


 


BP  110/59 L  02/08/20 17:42


 


Pulse Ox  96   02/08/20 17:42














- Orders/Labs/Meds


Labs: 


 Laboratory Tests











  02/08/20 02/08/20 02/08/20 Range/Units





  16:10 16:10 16:10 


 


WBC  7.82    (4.0-11.0)  K/uL


 


RBC  5.18    (4.50-5.90)  M/uL


 


Hgb  16.9    (13.0-17.0)  g/dL


 


Hct  48.9    (38.0-50.0)  %


 


MCV  94.4    (80.0-98.0)  fL


 


MCH  32.6 H    (27.0-32.0)  pg


 


MCHC  34.6    (31.0-37.0)  g/dL


 


RDW Std Deviation  48.4    (28.0-62.0)  fl


 


RDW Coeff of Bakari  14    (11.0-15.0)  %


 


Plt Count  266    (150-400)  K/uL


 


MPV  10.80    (7.40-12.00)  fL


 


Neut % (Auto)  47.9 L    (48.0-80.0)  %


 


Lymph % (Auto)  33.1    (16.0-40.0)  %


 


Mono % (Auto)  9.3    (0.0-15.0)  %


 


Eos % (Auto)  9.3 H    (0.0-7.0)  %


 


Baso % (Auto)  0.4    (0.0-1.5)  %


 


Neut # (Auto)  3.7    (1.4-5.7)  K/uL


 


Lymph # (Auto)  2.6 H    (0.6-2.4)  K/uL


 


Mono # (Auto)  0.7    (0.0-0.8)  K/uL


 


Eos # (Auto)  0.7    (0.0-0.7)  K/uL


 


Baso # (Auto)  0.0    (0.0-0.1)  K/uL


 


Nucleated RBC %  0.0    /100WBC


 


Nucleated RBCs #  0    K/uL


 


Lactate    1.6  (0.20-2.00)  mmol/L


 


Sodium   141   (136-148)  mmol/L


 


Potassium   4.3   (3.5-5.1)  mmol/L


 


Chloride   105   ()  mmol/L


 


Carbon Dioxide   25.5   (21.0-32.0)  mmol/L


 


BUN   9   (7.0-18.0)  mg/dL


 


Creatinine   0.9   (0.8-1.3)  mg/dL


 


Est Cr Clr Drug Dosing   112.38   mL/min


 


Estimated GFR (MDRD)   > 60.0   ml/min


 


Glucose   125 H   ()  mg/dL


 


Calcium   8.9   (8.5-10.1)  mg/dL


 


Total Bilirubin   0.2   (0.2-1.0)  mg/dL


 


AST   21   (15-37)  IU/L


 


ALT   26   (14-63)  IU/L


 


Alkaline Phosphatase   89   ()  U/L


 


Total Protein   7.3   (6.4-8.2)  g/dL


 


Albumin   4.0   (3.4-5.0)  g/dL


 


Globulin   3.3   (2.6-4.0)  g/dL


 


Albumin/Globulin Ratio   1.2   (0.9-1.6)  


 


Lipase   197   ()  U/L


 


Urine Color     


 


Urine Appearance     


 


Urine pH     (5.0-8.0)  


 


Ur Specific Gravity     (1.001-1.035)  


 


Urine Protein     (NEGATIVE)  mg/dL


 


Urine Glucose (UA)     (NEGATIVE)  mg/dL


 


Urine Ketones     (NEGATIVE)  mg/dL


 


Urine Occult Blood     (NEGATIVE)  


 


Urine Nitrite     (NEGATIVE)  


 


Urine Bilirubin     (NEGATIVE)  


 


Urine Urobilinogen     (<2.0)  EU/dL


 


Ur Leukocyte Esterase     (NEGATIVE)  


 


Urine Opiates Screen     (NEGATIVE)  


 


Ur Oxycodone Screen     (NEGATIVE)  


 


Urine Methadone Screen     (NEGATIVE)  


 


Ur Barbiturates Screen     (NEGATIVE)  


 


Ur Phencyclidine Scrn     (NEGATIVE)  


 


Ur Amphetamine Screen     (NEGATIVE)  


 


U Methamphetamines Scrn     (NEGATIVE)  


 


U Benzodiazepines Scrn     (NEGATIVE)  


 


U Cocaine Metab Screen     (NEGATIVE)  


 


U Marijuana (THC) Screen     (NEGATIVE)  














  02/08/20 02/08/20 Range/Units





  16:38 16:38 


 


WBC    (4.0-11.0)  K/uL


 


RBC    (4.50-5.90)  M/uL


 


Hgb    (13.0-17.0)  g/dL


 


Hct    (38.0-50.0)  %


 


MCV    (80.0-98.0)  fL


 


MCH    (27.0-32.0)  pg


 


MCHC    (31.0-37.0)  g/dL


 


RDW Std Deviation    (28.0-62.0)  fl


 


RDW Coeff of Bakari    (11.0-15.0)  %


 


Plt Count    (150-400)  K/uL


 


MPV    (7.40-12.00)  fL


 


Neut % (Auto)    (48.0-80.0)  %


 


Lymph % (Auto)    (16.0-40.0)  %


 


Mono % (Auto)    (0.0-15.0)  %


 


Eos % (Auto)    (0.0-7.0)  %


 


Baso % (Auto)    (0.0-1.5)  %


 


Neut # (Auto)    (1.4-5.7)  K/uL


 


Lymph # (Auto)    (0.6-2.4)  K/uL


 


Mono # (Auto)    (0.0-0.8)  K/uL


 


Eos # (Auto)    (0.0-0.7)  K/uL


 


Baso # (Auto)    (0.0-0.1)  K/uL


 


Nucleated RBC %    /100WBC


 


Nucleated RBCs #    K/uL


 


Lactate    (0.20-2.00)  mmol/L


 


Sodium    (136-148)  mmol/L


 


Potassium    (3.5-5.1)  mmol/L


 


Chloride    ()  mmol/L


 


Carbon Dioxide    (21.0-32.0)  mmol/L


 


BUN    (7.0-18.0)  mg/dL


 


Creatinine    (0.8-1.3)  mg/dL


 


Est Cr Clr Drug Dosing    mL/min


 


Estimated GFR (MDRD)    ml/min


 


Glucose    ()  mg/dL


 


Calcium    (8.5-10.1)  mg/dL


 


Total Bilirubin    (0.2-1.0)  mg/dL


 


AST    (15-37)  IU/L


 


ALT    (14-63)  IU/L


 


Alkaline Phosphatase    ()  U/L


 


Total Protein    (6.4-8.2)  g/dL


 


Albumin    (3.4-5.0)  g/dL


 


Globulin    (2.6-4.0)  g/dL


 


Albumin/Globulin Ratio    (0.9-1.6)  


 


Lipase    ()  U/L


 


Urine Color  YELLOW   


 


Urine Appearance  CLEAR   


 


Urine pH  6.0   (5.0-8.0)  


 


Ur Specific Gravity  <= 1.005   (1.001-1.035)  


 


Urine Protein  NEGATIVE   (NEGATIVE)  mg/dL


 


Urine Glucose (UA)  NEGATIVE   (NEGATIVE)  mg/dL


 


Urine Ketones  NEGATIVE   (NEGATIVE)  mg/dL


 


Urine Occult Blood  NEGATIVE   (NEGATIVE)  


 


Urine Nitrite  NEGATIVE   (NEGATIVE)  


 


Urine Bilirubin  NEGATIVE   (NEGATIVE)  


 


Urine Urobilinogen  0.2   (<2.0)  EU/dL


 


Ur Leukocyte Esterase  NEGATIVE   (NEGATIVE)  


 


Urine Opiates Screen   NEGATIVE  (NEGATIVE)  


 


Ur Oxycodone Screen   NEGATIVE  (NEGATIVE)  


 


Urine Methadone Screen   NEGATIVE  (NEGATIVE)  


 


Ur Barbiturates Screen   NEGATIVE  (NEGATIVE)  


 


Ur Phencyclidine Scrn   NEGATIVE  (NEGATIVE)  


 


Ur Amphetamine Screen   NEGATIVE  (NEGATIVE)  


 


U Methamphetamines Scrn   NEGATIVE  (NEGATIVE)  


 


U Benzodiazepines Scrn   NEGATIVE  (NEGATIVE)  


 


U Cocaine Metab Screen   NEGATIVE  (NEGATIVE)  


 


U Marijuana (THC) Screen   NEGATIVE  (NEGATIVE)  











Meds: 


Medications














Discontinued Medications














Generic Name Dose Route Start Last Admin





  Trade Name Freq  PRN Reason Stop Dose Admin


 


Sodium Chloride  1,000 mls @ 999 mls/hr  02/08/20 15:49  02/08/20 16:17





  Normal Saline  IV  02/08/20 16:49  999 mls/hr





  STAT ONE   Administration





     





     





     





     


 


Ketorolac Tromethamine  30 mg  02/08/20 17:09  02/08/20 17:16





  Toradol  IVPUSH  02/08/20 17:10  30 mg





  ONETIME ONE   Administration





     





     





     





     














Departure





- Departure


Time of Disposition: 17:07


Disposition: Home, Self-Care 01


Clinical Impression: 


 Viral URI





Abdominal pain


Qualifiers:


 Abdominal location: left upper quadrant Qualified Code(s): R10.12 - Left upper 

quadrant pain








- Discharge Information


Instructions:  Viral Respiratory Infection, Easy-To-Read, Abdominal Pain, Adult

, Easy-to-Read


Referrals: 


PCP,None [Primary Care Provider] - 


Forms:  ED Department Discharge


Additional Instructions: 


The following information is given to patients seen in the emergency department 

who are being discharged to home. This information is to outline your options 

for follow-up care. We provide all patients seen in our emergency department 

with a follow-up referral.





The need for follow-up, as well as the timing and circumstances, are variable 

depending upon the specifics of your emergency department visit.





If you don't have a primary care physician on staff, we will provide you with a 

referral. We always advise you to contact your personal physician following an 

emergency department visit to inform them of the circumstance of the visit and 

for follow-up with them and/or the need for any referrals to a consulting 

specialist.





The emergency department will also refer you to a specialist when appropriate. 

This referral assures that you have the opportunity for follow-up care with a 

specialist. All of these measure are taken in an effort to provide you with 

optimal care, which includes your follow-up.





Under all circumstances we always encourage you to contact your private 

physician who remains a resource for coordinating your care. When calling for 

follow-up care, please make the office aware that this follow-up is from your 

recent emergency room visit. If for any reason you are refused follow-up, 

please contact the [facility] Emergency 

Department at [phone] and asked to speak to the emergency department 

charge nurse.





[facility]


Primary Care


[address] [place] [id number]


Phone: [phone]


Fax: [phone]





[facility]


[address] [place] [id number]


Phone: [phone]


Fax: [phone]





1. Loup diet and advance as tolerated.  Small frequent sips of fluids to 

prevent dehydration.  


2.  Tylenol and/or ibuprofen as needed for pain management


3. Follow up with your primary care provider. Return to the ED as needed as 

discussed. 





Sepsis Event Note





- Focused Exam


Vital Signs: 


 Vital Signs











  Temp Pulse Resp BP Pulse Ox


 


 02/08/20 17:42   94  16  110/59 L  96


 


 02/08/20 17:18  98.4 F  100  16  109/61  97


 


 02/08/20 16:00  98.2 F  125 H  16  117/70  96











Date Exam was Performed: 02/08/20


Time Exam was Performed: 17:53

## 2020-02-08 NOTE — CR
Chest: 2 views of the chest were obtained.

 

Comparison: Prior chest x-ray of 07/24/17.

 

Heart size and mediastinum are normal.  Lungs are clear with no acute 

parenchymal change being seen.  Bony structures appear within normal 

limits for the patient's age.

 

Impression:

1.  Nothing acute is appreciated on 2 view chest x-ray.

 

Diagnostic code #1

 

This report was dictated in [place] Standard Time

## 2020-02-10 ENCOUNTER — HOSPITAL ENCOUNTER (EMERGENCY)
Dept: HOSPITAL 56 - MW.ED | Age: 40
LOS: 1 days | Discharge: HOME | End: 2020-02-11
Payer: COMMERCIAL

## 2020-02-10 DIAGNOSIS — F17.210: ICD-10-CM

## 2020-02-10 DIAGNOSIS — F41.9: ICD-10-CM

## 2020-02-10 DIAGNOSIS — F32.9: ICD-10-CM

## 2020-02-10 DIAGNOSIS — R05: ICD-10-CM

## 2020-02-10 DIAGNOSIS — R53.81: ICD-10-CM

## 2020-02-10 DIAGNOSIS — M79.10: Primary | ICD-10-CM

## 2020-02-11 VITALS — SYSTOLIC BLOOD PRESSURE: 134 MMHG | HEART RATE: 83 BPM | DIASTOLIC BLOOD PRESSURE: 90 MMHG

## 2020-02-11 NOTE — CR
INDICATION: Shortness of breath



TECHNIQUE: Chest radiograph 2 views



COMPARISON: None



FINDINGS: 



Mediastinum: The mediastinum is normal in appearance. The heart silhouette 

is normal in size and morphology. 



Lung: Both lungs are unremarkable in appearance. No sign of pleural 

effusion seen. No pneumothorax is identified. 



Bone and Soft tissue: Unremarkable for age. 



IMPRESSION: 



1. No acute cardiopulmonary disease is seen. 



Dictated by: Ebenezer Power MD @ 02/11/2020 00:23:33



(Electronically Signed)

## 2020-02-11 NOTE — EDM.PDOC
ED HPI GENERAL MEDICAL PROBLEM





- General


Chief Complaint: Fever


Stated Complaint: FLU SYMPTOMS


Time Seen by Provider: 02/11/20 00:06





- History of Present Illness


INITIAL COMMENTS - FREE TEXT/NARRATIVE: 





HPI


39-year-old male presents for evaluation of one day of subjective fevers and 

chills, patient also notes myalgias and several loose watery stools. Continues 

to take PO well. No rashes, headache, changes in vision or hearing, abdominal 

pain, notes mild shortness of breath.





M/S/F/SocHx notable for: please see HPI; remainder reviewed with patient and in 

chart. 





ROS: Negative constitutional, eye, cardiovascular, pulmonary, GI, , MSK, skin

, neurologic, psychiatric, endocrine unless noted in the HPI.





Exam


HR 80, RR 18, /84, T 37.6C, SaO2 95% on room air.


Gen: Pleasant, non-toxic appearing, resting comfortably.


HEENT: Normocephalic, atraumatic. 


* Eyes - Bilateral eyes without injection, swelling, or discharge, no proptosis 

or periorbital erythema, swelling, warmth, or tenderness. 


 * Mouth - oropharynx visually normal, moist mucous membranes. 


 * Nose - Nares without crusting or discharge.


 * Neck - neck supple, full ROM.


Resp: Clear to auscultation bilaterally, normal work of breathing without 

accessory muscle usage.


Card: Regular rate and rhythm with no murmurs, rubs or gallops. Extremities 

warm and well perfused.


GI: Non-tender to palpation throughout all quadrants, no masses or organomegaly 

appreciated.


: Deferred


MSK: No visible deformities, strength and tone without visually appreciable 

deficit.


Neuro: alert and oriented  3, no facial asymmetry, vision and hearing WNL.


Heme/Lymph: Deferred


Skin: Normal color with no visible lesions (other than noted above).


Psych: Mood and affect appropriate.





Labs / Imaging (pertinent):


Influenza A & B negative.





CXR: no acute cardiopulmonary disease process is seen. 





MDM


Previous chart, nursing note, and vitals reviewed. 


A: 39-year-old male presents for evaluation of one day of subjective fevers and 

chills, patient also notes myalgias and several loose watery stools.





DDx: influenza, parainfluenza virus, viral rhinosinusitis, bacterial pneumonia, 

septicemia. 





Evaluation: The overall clinical picture is suggestive of influenza and a 

negative rapid antigen test was obtained, this was obtained as the patient 

wished to pursue treatment with oseltamivir. Bacterial pneumonia as well 

septicemia were considered on the differential, however given the lack of a 

focal infiltrate on chest x-ray as well as the absence of significant vital 

sign abnormalities and an unremarkable exam, these are both felt to be 

relatively excluded. Patient instructed to use ibuprofen and acetaminophen for 

discomfort, PCP follow-up recommended. Return to care precautions provided.





Impression: cough, malaise, myalgias. 





- Related Data


 Allergies











Allergy/AdvReac Type Severity Reaction Status Date / Time


 


No Known Allergies Allergy   Verified 02/08/20 15:59











Home Meds: 


 Home Meds





. [No Known Home Meds]  02/08/20 [History]











Past Medical History





- Past Health History


Medical/Surgical History: Denies Medical/Surgical History


Cardiovascular History: Reports: None


Respiratory History: Reports: Asthma


Gastrointestinal History: Reports: None


Psychiatric History: Reports: Addiction, Anxiety, Depression


Endocrine/Metabolic History: Reports: None





- Infectious Disease History


Infectious Disease History: Reports: Chicken Pox





Social & Family History





- Family History


Family Medical History: Noncontributory


Cardiac: Reports: MI


Other Cardiac Family History: Father had heart attack in 2013 with stents 

placed.





- Tobacco Use


Smoking Status *Q: Current Every Day Smoker


Years of Tobacco use: 20


Packs/Tins Daily: 1





- Caffeine Use


Caffeine Use: Reports: Coffee


Caffeine Use Comment: 3 cups daily





- Recreational Drug Use


Recreational Drug Use: No





- Living Situation & Occupation


Living situation: Reports: 


Occupation: Employed





ED ROS GENERAL





- Review of Systems


Review Of Systems: See Below





ED EXAM, GENERAL





- Physical Exam


Exam: See Below





Course





- Vital Signs


Last Recorded V/S: 





 Last Vital Signs











Temp  37.6 C   02/10/20 23:30


 


Pulse  80   02/10/20 23:30


 


Resp  18   02/10/20 23:30


 


BP  135/84   02/10/20 23:30


 


Pulse Ox  95   02/10/20 23:30














Departure





- Departure


Time of Disposition: 00:39


Disposition: Home, Self-Care 01


Clinical Impression: 


 Myalgia, Cough








- Discharge Information


Referrals: 


PCP,None [Primary Care Provider] - 


Additional Instructions: 


You were in seen in the [facility] 

Emergency Department for evaluation of cough, malaise, and muscle aches. At the 

time of your evaluation the cause of your symptoms is tentatively believed to 

be due to a virus. You had a negative rapid influenza test. There is a small 

possibility that you may have influenza despite negative testing. You may take 

ibuprofen and acetaminophen instructed below for treatment of discomfort, 

please stay well-hydrated and return to the emergency department if you develop 

any new symptoms or are otherwise concerned about your health. Please read and 

follow all of the instructions below.





Please follow up with your primary care physician as needed. When calling for 

follow-up care, please make the office aware that this follow-up is from your 

recent emergency room visit. If for any reason you are refused follow-up, 

please contact the [facility] Emergency 

Department at [phone] and asked to speak to the emergency department 

charge nurse. 





Your care today was limited to identifying and treating emergent medical 

problems only. Many people have subtle differences in their test results that 

require follow up with their outpatient physician(s) to correctly determine if 

this represents a normal variation or concerning abnormality with respect to 

your specific health. The care given to you today was limited to identifying 

and treating emergent medical problems - you need to request a copy of all of 

your medical records from today's visit and follow up with your outpatient 

physician(s) to review both today's visit and your overall health. If you have 

any new symptoms or if you are at all concerned about your health please return 

immediately to the emergency department.





Viral Syndrome


You are believed to have a viral infection of the respiratory tract. These 

infections may cause chills, fever, cough, headache, body aches, and sore 

throat. Depending upon the virus, you may have mild to more severe symptoms. 

The worst symptoms typically last a 2-5 days. Cough and fatigue may continue 

for as long as 7 to 10 days. Viral respiratory infections are highly 

contagious. Symptoms will not be reduced or improved by taking an antibiotic. 

Antibiotics are medications that kill bacteria, not viruses. Rarely these 

infections can lead to an infection of the sinuses, lungs, or middle ear. 

However antibiotics at this point in your illness antibiotics will not help 

prevent these uncommon complications. 





Home Care Instructions: 


* Care for viral infections will not shorten your illness but can help reduce 

your symptoms.


* Please stay well hydrated and attempt to get plently of sleep.


* You may take both ibuprofen and acetaminophen as directed on the bottle for 

relief of fever, chills, and muscle aches. 


* If you have a severe cough you may take an over-the-counter cough medication 

containing dextromethorphan.


* Continue to cover your cough and wash your hands often.


* Read the package instructions and warnings on any medication that you are 

taking.





Return to the Emergency Department if you have:


* Fast breathing, trouble breathing, or shortness of breath


* Bluish or gray skin color 


* Not drinking enough fluids 


* Severe or persistent vomiting 


* Not waking up or not interacting 


* Pain or pressure in the chest or abdomen 


* Sudden dizziness 


* Confusion 


* Or if you are otherwise concerned about your health





Please follow-up your primary care provider or return to the emergency 

department if:


* Your symptoms fail to improve over the next 4-5 days.


* Your symptoms improve but then significantly worsen - this may be a sign of a 

bacterial infection which will need to be treated.


You are otherwise concerned about your health.





You make take over the counter Acetaminophen (Tylenol) and Ibuprofen (Motrin or 

Aleve) as directed below for relief of pain. 


   Take 600 mg of ibuprofen (three 200 mg tablets) with a glass of water every 

6-8 hours as needed for pain or fever. Do not take if you have ulcers, GI 

bleeding, are pregnant, or are allergic to ibuprofen.


   Take 1,000 mg of acetaminophen (two 500 mg tablets) with a glass of water 

every 6-8 hours as needed for pain. Do not take if you are allergic to 

acetaminophen. If you have liver disease, please reduce your dose to a maximum 

of 2,000 mg per day.


   You can take these medications at the same time or on separate schedules. 


   Do not take for more than 10 days.


   Do not take with alcohol or other acetaminophen containing medications.  


   This medication may cause a mildly upset stomach, if so take it with a 

small snack. Stop taking it if you have persistent abdominal pain, heartburn, 

or any stomach pain. Do not take this medication if you have known ulcers. 


   Please read the warnings at the end of this document regarding these 

medications. 





IBUPROFEN WARNING: This drug may infrequently cause serious (rarely fatal) 

bleeding from the stomach or intestines. Also, related drugs rarely have caused 

blood clots to form, resulting in heart attacks and strokes. This medication 

might also rarely cause similar problems. Talk to your doctor or pharmacist 

about the benefits and risks of treatment, as well as other possible medication 

choices. If you notice any of the following rare but very serious side effects, 

stop taking ibuprofen and seek immediate medical attention: black stools, 

persistent stomach/abdominal pain, vomit that looks like coffee grounds, chest 

pain, weakness on one side of the body, sudden vision changes, slurred speech. 





IBUPROFEN SIDE EFFECTS: Upset stomach, nausea, vomiting, heartburn, headache, 

diarrhea, constipation, drowsiness, and dizziness may occur. If any of these 

effects persist or worsen, notify your doctor or pharmacist promptly. If your 

doctor has directed you to use this medication, remember that he or she has 

judged that the benefit to you is greater than the risk of side effects. Many 

people using this medication do not have serious side effects. Tell your doctor 

immediately if any of these serious side effects occur: stomach pain, swelling 

of the hands or feet, sudden or unexplained weight gain, ringing in the ears (

tinnitus). Tell your doctor immediately if any of these unlikely but serious 

side effects occur: vision changes, rapid or pounding heartbeat, easy bruising 

or bleeding, difficult/painful swallowing. Tell your doctor immediately if any 

of these highly unlikely but very serious side effects occur: change in amount 

of urine, severe headache, very stiff neck, mental/mood changes, persistent 

sore throat or fever. This drug may rarely cause serious (possibly fatal) liver 

disease. If you notice any of the following highly unlikely but very serious 

side effects, stop taking ibuprofen and consult your doctor or pharmacist 

immediately: yellowing eyes and skin, dark urine, unusual/extreme tiredness. An 

allergic reaction to this drug is unlikely, but seek immediate medical 

attention if it occurs. Symptoms of an allergic reaction include: rash, itching/

swelling (especially of the face/tongue/throat), severe dizziness, trouble 

breathing. This is not a complete list of possible side effects. 





ACETAMINOPHEN SIDE EFFECTS: This drug usually has no side effects. If you do 

not have liver problems, the maximum dose of acetaminophen for adults is 4 

grams per day (4000 milligrams). Taking more than the maximum daily amount may 

cause serious (possibly fatal) liver damage. Get medical help right away if you 

have any of the following symptoms of liver damage: persistent nausea/vomiting, 

extreme tiredness, stomach/abdominal pain, yellowing eyes/skin, dark urine. If 

you have liver problems, consult your doctor or pharmacist for a safe dosage of 

this medication. A very serious allergic reaction to this drug is rare. However

, get medical help right away if you notice any symptoms of a serious allergic 

reaction, including: rash, itching/swelling (especially of the face/tongue/

throat), severe dizziness, trouble breathing. This is not a complete list of 

possible side effects. If you notice other effects not listed above, contact 

your doctor or pharmacist.





DRUG INTERACTIONS: Your healthcare professionals (e.g., doctor or pharmacist) 

may already be aware of any possible drug interactions and may be monitoring 

you for it. Do not start, stop or change the dosage of any medicine before 

checking with them first. This drug should not be used with the following 

medications because very serious interactions may occur: cidofovir, ketorolac. 

If you are currently using any of these medications listed above, tell your 

doctor or pharmacist before starting ibuprofen. Before using this medication, 

tell your doctor or pharmacist of all prescription and nonprescription/herbal 

products you may use, especially of: anti-platelet drugs (e.g., cilostazol, 

clopidogrel), oral bisphosphonates (e.g., alendronate), other medications for 

arthritis (e.g., aspirin, methotrexate), "blood thinners" (e.g., enoxaparin, 

heparin, warfarin), corticosteroids (e.g., prednisone), cyclosporine, 

desmopressin, high blood pressure drugs (including ACE inhibitors such as 

captopril, angiotensin II receptor antagonists such as losartan, and beta-

blockers such as metoprolol), lithium, pemetrexed, "water pills" (diuretics 

such as furosemide, hydrochlorothiazide, triamterene). Check all prescription 

and nonprescription medicine labels carefully for other pain/fever drugs (

NSAIDs such as aspirin, celecoxib, naproxen). These drugs are similar to 

ibuprofen, so taking one of these drugs while also taking ibuprofen may 

increase your risk of side effects. Consult your doctor or pharmacist for more 

details. However, if your doctor has prescribed low doses of aspirin to prevent 

heart attack or stroke (usually at dosages of  milligrams a day), you 

should continue to take the aspirin. Daily use of ibuprofen may decrease aspirin

's ability to prevent heart attack/stroke. Talk to your doctor about using a 

different medication (e.g., acetaminophen) to treat pain/fever. If you must 

take ibuprofen, talk to your doctor about possibly taking immediate-release 

aspirin (not enteric-coated) while also taking the ibuprofen dose apart from 

your aspirin dose. Do not increase your daily dose of aspirin or change the way 

you take aspirin/other medications without your doctor's approval. This 

document does not contain all possible interactions. Therefore, before using 

this product, tell your doctor or pharmacist of all the products you use. Keep 

a list of all your medications with you, and share the list with your doctor 

and pharmacist. 





Prescriptions:


If you are uninsured or have financial difficulties with filling your 

prescription(s), you may consider using a free pharmacy discount service such 

as [employer] ([employer]) or [employer] ([employer] These services allow 

you to search for a medication on your phone (or computer) and obtain a coupon 

that usually has a significant discount from the list price at a pharmacy. Your 

physician as well as [facility] does not have a financial 

relationship with either of these services. You may also wish to speak with 

your physician to determine if lower cost prescriptions are possible.





Obtaining primary care:


1.   [facility] provides pediatrics (children), 

family medicine (children, adults, and some obstetrical care), and internal 

medicine (adults). Further specialty care is also available. Same day 

appointments are available. They may be contacted at [phone] and are open 

Monday through Friday 8 AM to 5 PM. [facility] are 

located at [facility], [address] [place] [address].


2.   [facility] offers family medicine, internal 

medicine, womens health, and further specialty care. [facility] 

may be contacted at [phone]. [facility] is 

located at [address] [place] [address].


3.   If you have health insurance, please also contact your insurer for a list 

of accepting providers under your policy, you may contact these providers for 

further health care.





Occupational health:


Work related injuries may consider following up with [facility], [phone]. Occupational health services are located at [address] [place] [address] and are open Monday through Friday from 7:

30 am to 5:00 pm.





Obstetrical and Gynecological Care:


[facility], [phone], Monday through Friday 8 AM to 

5 PM. [address] [address].





Eyecare:


If you have an eye injury you should follow up with your ophthalmologist or 

with [facility], at [phone] or [phone]

, they are located at [address] [place] [address].





Dental Care 


   Jason PETERSON DDS. [address] [facility].[place], [place]. Ph. [phone]


   Vamshi PETERSON DDS MS. [address] [facility] [address], [place], [place]. Ph. [phone]


   Stanford TABARES DDS. [address] [place] [address]. Ph. [phone]


   Pedro Villegas DDS. [address] [facility] [address] [place], [place]. Ph. [phone]


   Louis Gordon  DDS PC. [address] [facility] [address] [place], [place]. Ph. [phone]


   Ricardo MOSQUEDA DDS. [address] [place] [address]. Ph. [phone]


   [facility]. [address] [place], [place]. Ph. [phone]


   [facility]. [address] [place] [address], [place], [place]. 

Ph. [phone]


   [facility] [address] [place] [address]. Ph. [phone]


   [facility]. [address] [place] [address] [address]. [place], [place]. Ph. [phone]


   [facility]. [address] [place], [place]. Ph. 

[phone]











What is the flu?


The flu is a viral infection that can cause fever, cough, body aches, and other 

symptoms. There are different forms of the flu, including the "seasonal" flu, 

the 4665-7215 pandemic H1N1 flu (also called the "swine" flu), and the bird 

flu. All forms of the flu are caused by viruses. The medical term for the flu 

is "influenza." All forms of the flu can cause fevers, cough, headaches or body 

aches, and a sore throat or runny nose.





Return to the emergency department if you have any of the following:


* Have trouble breathing or are short of breath


* Feel pain or pressure in your chest or belly


* Get suddenly dizzy


* Feel confused


* Have severe vomiting


* If you are breathing fast, have trouble breathing, are if you turn blue or 

purple


* If you are excessively fatigued you have difficulty waking up


* If you start getting better from the flu but then have worsening sickness, 

this could represent a secondary bacterial infection. 


* If you develop a fever, rash, neck stiffness, headaches, or bright lights 

bother you.


* If you are otherwise concerned about your health


* If you decide to go to a walk-in clinic or a hospital because of the flu, 

tell someone right away why you are there. The staff might ask you to wear a 

mask or to wait someplace where you are less likely to spread your infection.





Home Care 


The primary treatment for influenza is supportive care. Please stay well-

hydrated, rest, consume a light diet, and - if you do not have any allergies or 

other reasons not to - you may take ibuprofen and acetaminophen as directed on 

the bottle for symptomatic relief from your fevers and aches. Do not go to work 

or school until your fever has been gone for at least 24 hours, without a 

taking fever-reducing medicine, such as acetaminophen or ibuprofen. If you work 

in a healthcare setting taking care of patients, you might need to stay home 

longer if you are still coughing. Also, always cover your mouth and nose with 

the inside of your elbow when you cough or sneeze.





Sepsis Event Note





- Evaluation


Sepsis Screening Result: No Definite Risk





- Focused Exam


Vital Signs: 





 Vital Signs











  Temp Pulse Resp BP Pulse Ox


 


 02/10/20 23:30  37.6 C  80  18  135/84  95











Date Exam was Performed: 02/11/20


Time Exam was Performed: 00:39

## 2020-03-25 ENCOUNTER — HOSPITAL ENCOUNTER (EMERGENCY)
Dept: HOSPITAL 56 - MW.ED | Age: 40
Discharge: HOME | End: 2020-03-25
Payer: COMMERCIAL

## 2020-03-25 VITALS — HEART RATE: 84 BPM | SYSTOLIC BLOOD PRESSURE: 136 MMHG | DIASTOLIC BLOOD PRESSURE: 97 MMHG

## 2020-03-25 DIAGNOSIS — S61.411A: Primary | ICD-10-CM
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
## 2020-03-25 DIAGNOSIS — F17.210: ICD-10-CM

## 2020-03-25 PROCEDURE — 99282 EMERGENCY DEPT VISIT SF MDM: CPT

## 2020-03-25 PROCEDURE — 12002 RPR S/N/AX/GEN/TRNK2.6-7.5CM: CPT

## 2020-03-25 NOTE — EDM.PDOC
ED HPI GENERAL MEDICAL PROBLEM





- General


Chief Complaint: Laceration


Stated Complaint: CUT RT HAND


Time Seen by Provider: 03/25/20 12:49


Source of Information: Reports: Patient


History Limitations: Reports: No Limitations





- History of Present Illness


INITIAL COMMENTS - FREE TEXT/NARRATIVE: 


HISTORY AND PHYSICAL:





History of present illness:


Patient is a 39-year-old male who presents to the emergency room today with 

complaints of laceration to his right hand after a transmission in his hand and 

he pulled it out.  He reports that his last tetanus was updated within the last 

5 years.  Denies any other extremity involvement.  Offers no systemic 

complaints.





Review of systems: 


As per history of present illness and below otherwise all systems reviewed and 

negative.





Past medical history: 


As per history of present illness and as reviewed below otherwise 

noncontributory.





Surgical history: 


As per history of present illness and as reviewed below otherwise 

noncontributory.





Social history: 


See social history for further information





Family history: 


As per history of present illness and as reviewed below otherwise 

noncontributory.





Physical exam:


General: Well developed and well nourished 39 year old male. Alert and 

orientated.  Nontoxic-appearing and in no acute distress.


HEENT: Atraumatic, normocephalic, pupils equal and reactive bilaterally, 

negative for conjunctival pallor or scleral icterus, mucous membranes moist, 

TMs normal bilaterally, throat clear, neck supple, nontender, trachea midline. 

No drooling or trismus noted. No meningeal signs. No hot potato voice noted. 


Lungs: Clear to auscultation, breath sounds equal bilaterally, chest nontender.


Heart: S1S2, regular rate and rhythm without overt murmur


Abdomen: Soft, nondistended, nontender. 


Skin: 3 cm laceration to the webspace between the right 2nd and 3rd digit. 

3.5cm "L" shaped laceration to the right index finger. Otherwise skin is intact

, warm, dry. No lesions or rashes noted.


Extremities: See SKIN for details. Good flexion and extension of digits. 

Appears to have no tendon involvement. He moves all extremities per self 

without difficulty or deficits, negative for cords or calf pain. Neurovascular 

unremarkable.


Neuro: Awake, alert, oriented. Cranial nerves II through XII unremarkable. 

Cerebellum unremarkable. Motor and sensory unremarkable throughout. Exam 

nonfocal.





Notes:


Declines x-ray.  Usual and customary procedures were followed for suture 

placement.  4-0 nylon, #5 interrupted sutures were placed in the 3cm laceration 

and #6 interrupted sutures placed in the 3.5 cm laceration.  Appears to have no 

tendon involvement.  Bacitracin nonstick dressing was applied.  Tolerated well.


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 

education was done on keeping the lacerated area clean and dry.  Supportive 

care measures were reviewed and discussed. Voices understanding and is 

agreeable to plan of care. Denies any further questions or concerns at this 

time.





Diagnostics:


None





Therapeutics:


Lidocaine, Bacitracin ointment





Prescription:


Keflex





Impression: 


Laceration





Plan:


1. Keep the area clean and dry. Continue to monitor for signs of infection. 

Sutures to be removed in 7-10 days.


2. Tylenol and/or ibuprofen as needed for pain management.


3. Please follow-up with your primary care provider in the next 1-2 days. 

Return to the ED as needed and as discussed.





Definitive disposition and diagnosis as appropriate pending reevaluation and 

review of above.





  ** right hand


Pain Score (Numeric/FACES): 9





- Related Data


 Allergies











Allergy/AdvReac Type Severity Reaction Status Date / Time


 


No Known Allergies Allergy   Verified 03/25/20 12:51











Home Meds: 


 Home Meds





Cephalexin [Keflex] 500 mg PO BID 5 Days #10 capsule 03/25/20 [Rx]











Past Medical History





- Past Health History


Medical/Surgical History: Denies Medical/Surgical History


HEENT History: Reports: None


Cardiovascular History: Reports: None


Respiratory History: Reports: Asthma


Gastrointestinal History: Reports: None


Genitourinary History: Reports: None


Musculoskeletal History: Reports: None


Neurological History: Reports: None


Psychiatric History: Reports: Addiction, Anxiety, Depression


Endocrine/Metabolic History: Reports: None


Hematologic History: Reports: None


Immunologic History: Reports: None


Oncologic (Cancer) History: Reports: None


Dermatologic History: Reports: None





- Infectious Disease History


Infectious Disease History: Reports: None





- Past Surgical History


Head Surgeries/Procedures: Reports: None


HEENT Surgical History: Reports: None


Cardiovascular Surgical History: Reports: None


Respiratory Surgical History: Reports: None


GI Surgical History: Reports: None


Male  Surgical History: Reports: None


Endocrine Surgical History: Reports: None


Neurological Surgical History: Reports: None


Musculoskeletal Surgical History: Reports: None


Oncologic Surgical History: Reports: None


Dermatological Surgical History: Reports: None





Social & Family History





- Family History


Family Medical History: Noncontributory


Cardiac: Reports: MI


Other Cardiac Family History: Father had heart attack in 2013 with stents 

placed.





- Tobacco Use


Smoking Status *Q: Current Every Day Smoker


Years of Tobacco use: 10


Packs/Tins Daily: 0.5





- Caffeine Use


Caffeine Use: Reports: Coffee, Energy Drinks, Soda, Tea


Caffeine Use Comment: 3 cups daily





- Recreational Drug Use


Recreational Drug Use: Yes


Recreational Drug Type: Reports: Marijuana/Hashish


Recreational Drug Use Frequency: Weekly





- Living Situation & Occupation


Living situation: Reports: 


Occupation: Employed





ED ROS GENERAL





- Review of Systems


Review Of Systems: Comprehensive ROS is negative, except as noted in HPI.





ED EXAM, SKIN/RASH


Exam: See Below (See dictation)





ED SKIN PROCEDURES





- Laceration/Wound Repair


  ** right web space


Appearance: Subcutaneous, Linear


Distal NVT: Neuro & Vascular Intact, No Tendon Injury


Anesthetic Type: Local


Local Anesthesia - Lidocaine (Xylocaine): 1% Plain


Local Anesthetic Volume: 3cc


Skin Prep: Chlorhexidine (Hibiciens), Saline


Saline Irrigation (cc's): 50


Exploration/Debridement/Repair: Wound Explored, In a Bloodless Field, Explored 

to Base, No Foreign Material Found


Closed with: Sutures


Lac/Wound length In cm: 3


Suture Size: 4-0


# of Sutures: 5


Suture Type: Nylon, Interrupted, Simple


Sterile Dressing Applied: Provider


Tetanus Status Addressed: Yes


Complications: No





  ** Right index finger


Appearance: Subcutaneous, Linear


Distal NVT: Neuro & Vascular Intact, No Tendon Injury


Anesthetic Type: Local


Local Anesthesia - Lidocaine (Xylocaine): 1% Plain


Local Anesthetic Volume: 5cc


Skin Prep: Chlorhexidine (Hibiciens), Saline


Saline Irrigation (cc's): 50


Exploration/Debridement/Repair: Wound Explored, In a Bloodless Field, Explored 

to Base, No Foreign Material Found


Closed with: Sutures


Lac/Wound length In cm: 3.5


Suture Size: 5-0


# of Sutures: 6


Suture Type: Nylon, Interrupted, Simple


Sterile Dressing Applied: Provider


Tetanus Status Addressed: Yes


Complications: No





Course





- Vital Signs


Last Recorded V/S: 


 Last Vital Signs











Temp  95.6 F L  03/25/20 12:51


 


Pulse  84   03/25/20 12:51


 


Resp  18   03/25/20 12:51


 


BP  136/97 H  03/25/20 12:51


 


Pulse Ox  97   03/25/20 12:51














- Orders/Labs/Meds


Meds: 


Medications














Discontinued Medications














Generic Name Dose Route Start Last Admin





  Trade Name Tim  PRN Reason Stop Dose Admin


 


Bacitracin  2 dose  03/25/20 12:56  03/25/20 13:44





  Bacitracin Oint 1 Gm  TOP  03/25/20 12:57  1 dose





  ONETIME ONE   Administration





     





     





     





     


 


Lidocaine HCl  10 ml  03/25/20 12:56  03/25/20 13:43





  Xylocaine 1%  INJECT  03/25/20 12:57  Not Given





  ONETIME ONE   





     





     





     





     


 


Lidocaine HCl  Confirm  03/25/20 13:06  03/25/20 13:43





  Xylocaine-Mpf 1%  Administered  03/25/20 13:07  Not Given





  Dose   





  10 ml   





  .ROUTE   





  .STK-MED ONE   





     





     





     





     


 


Lidocaine HCl  10 ml  03/25/20 13:44  03/25/20 13:45





  Xylocaine-Mpf 1%  INJECT  03/25/20 13:45  10 ml





  ONETIME ONE   Administration





     





     





     





     














Departure





- Departure


Time of Disposition: 12:56


Disposition: Home, Self-Care 01


Clinical Impression: 


 Laceration








- Discharge Information


Prescriptions: 


Cephalexin [Keflex] 500 mg PO BID 5 Days #10 capsule


Instructions:  Laceration Care, Adult, Easy-to-Read


Referrals: 


PCP,None [Primary Care Provider] - 


Forms:  ED Department Discharge


Additional Instructions: 


The following information is given to patients seen in the emergency department 

who are being discharged to home. This information is to outline your options 

for follow-up care. We provide all patients seen in our emergency department 

with a follow-up referral.





The need for follow-up, as well as the timing and circumstances, are variable 

depending upon the specifics of your emergency department visit.





If you don't have a primary care physician on staff, we will provide you with a 

referral. We always advise you to contact your personal physician following an 

emergency department visit to inform them of the circumstance of the visit and 

for follow-up with them and/or the need for any referrals to a consulting 

specialist.





The emergency department will also refer you to a specialist when appropriate. 

This referral assures that you have the opportunity for follow-up care with a 

specialist. All of these measure are taken in an effort to provide you with 

optimal care, which includes your follow-up.





Under all circumstances we always encourage you to contact your private 

physician who remains a resource for coordinating your care. When calling for 

follow-up care, please make the office aware that this follow-up is from your 

recent emergency room visit. If for any reason you are refused follow-up, 

please contact the [facility] Emergency 

Department at [phone] and asked to speak to the emergency department 

charge nurse.





[facility]


Primary Care


[address] [place] [address]


Phone: [phone]


Fax: [phone]





[facility]


[address] [place] [address]


Phone: [phone]


Fax: [phone]





1. Keep the area clean and dry. Continue to monitor for signs of infection. 

Sutures to be removed in 7-10 days.


2. Tylenol and/or ibuprofen as needed for pain management.


3. Please follow-up with your primary care provider in the next 1-2 days. 

Return to the ED as needed and as discussed.





Sepsis Event Note





- Evaluation


Sepsis Screening Result: No Definite Risk





- Focused Exam


Vital Signs: 


 Vital Signs











  Temp Pulse Resp BP Pulse Ox


 


 03/25/20 12:51  95.6 F L  84  18  136/97 H  97











Date Exam was Performed: 03/25/20


Time Exam was Performed: 13:51

## 2020-11-16 NOTE — CR
Indication:



Pain after heavy lifting



Comparison:



None available.



Technique:



AP, Lateral, and Oblique views left wrist were obtained



Findings:



There is no displaced fracture or dislocation.



The joint spaces are grossly preserved.



There is mild soft tissue prominence of the carpal soft tissues.



Impression:



Mild carpal soft tissue prominence without evidence of definite, displaced 

fracture. If there is pain and clinical suspicion for ligamentous or 

tendinous injury follow-up with MRI would be useful for improved 

characterization.



Dictated by Nadir Edmond MD @ Nov 16 2020  7:40PM



Signed by Dr. Nadir Edmond @ Nov 16 2020  7:43PM

## 2020-11-16 NOTE — EDM.PDOC
ED HPI GENERAL MEDICAL PROBLEM





- General


Chief Complaint: Upper Extremity Injury/Pain


Stated Complaint: LEFT WRIST INJURY


Time Seen by Provider: 11/16/20 19:12





- History of Present Illness


INITIAL COMMENTS - FREE TEXT/NARRATIVE: 





History of present illness:


[] The patient was doing heavy lifting today.  Now I cannot dorsiflex his left 

wrist.  He has some numbness in the area of the radial distribution.  He has 

severe pain in the dorsal wrist with range of motion.  The patient has no prior 

fracture.  He has no history of significant systemic arthritis.  He is a right-

handed individual of good health.  He has no fever chills or systemic signs of 

illness.





Review of systems: 


As per history of present illness and below otherwise all systems reviewed and 

negative.





Past medical history: 


As per history of present illness and as reviewed below otherwise 

noncontributory.





Surgical history: 


As per history of present illness and as reviewed below otherwise 

noncontributory.





Social history: 


No reported history of drug or alcohol abuse.





Family history: 


As per history of present illness and as reviewed below otherwise 

noncontributory.





Physical exam:


Constitutional - well developed, well-nourished and in no acute distress


HEENT - normocephalic, no evidence of trauma - external nose and mouth normal - 

no mass in neck and no JVD - mucosae moist





EYES - full EOM, PERRL, no icterus - no evidence of inflammation, injection, or 

drainage





Respiratory - no respiratory distress, equal bilateral expansion





Musculoskeletal  


 mild swelling on the dorsum of the left wrist.  Pain on range of motion and 

tender in that spot.  The pain on range of motion is markedly worse when he 

tries to dorsiflex and he is virtually unable to extend or dorsiflex the left 

wrist.  To be to due to pain or neuropraxia of the radial nerve.  Otherwiseno 

gross deformity of long bones or joints - no tenderness, swelling or edema 

elsewhere





Neurologic -minimal decrease intensity of sensation to touch in the radial 

distribution of the left hand.  The patient does not do voluntary active 

dorsiflexion at the wrist.  Alert and oriented times four - CN II-XII grossly 

intact - motor sensory and coordination symmetrically normal





Psychiatric - appropriate mood and affect with normal thought content





Hematologic - No petechiae or purpura - mucosa appropriate color and sclera not 

pale - normal nail bed color and refill





Integument - no rash or evidence of trauma - normal turgor





Diagnostics:


[] X-ray





Therapeutics: Dent and steroids


[]





Impression: Patient does not have the mechanism for complete rupture of the 

radial nerve.  It appears to be a neuropraxia or overuse injury or possible.  I 

will splint him in a cock-up position and put him on a high-dose steroids with 

taper and follow-up with orthopedics.


[]





Plan:


[]





Definitive disposition and diagnosis as appropriate pending reevaluation and 

review of above.








  ** L wrist


Pain Score (Numeric/FACES): 8





- Related Data


                                    Allergies











Allergy/AdvReac Type Severity Reaction Status Date / Time


 


No Known Allergies Allergy   Verified 11/16/20 19:10











Home Meds: 


                                    Home Meds





methylPREDNISolone [Medrol Dose Pack] 4 mg PO DAILY #21 tab 11/16/20 [Rx]











Past Medical History





- Past Health History


Medical/Surgical History: Denies Medical/Surgical History


HEENT History: Reports: None


Cardiovascular History: Reports: None


Respiratory History: Reports: Asthma


Gastrointestinal History: Reports: None


Genitourinary History: Reports: None


Musculoskeletal History: Reports: None


Neurological History: Reports: None


Psychiatric History: Reports: Anxiety, Depression


Endocrine/Metabolic History: Reports: None


Hematologic History: Reports: None


Immunologic History: Reports: None


Oncologic (Cancer) History: Reports: None


Dermatologic History: Reports: None





- Infectious Disease History


Infectious Disease History: Reports: None





- Past Surgical History


Head Surgeries/Procedures: Reports: None


HEENT Surgical History: Reports: None


Cardiovascular Surgical History: Reports: None


Respiratory Surgical History: Reports: None


GI Surgical History: Reports: None


Male  Surgical History: Reports: None


Endocrine Surgical History: Reports: None


Neurological Surgical History: Reports: None


Musculoskeletal Surgical History: Reports: None


Oncologic Surgical History: Reports: None


Dermatological Surgical History: Reports: None





Social & Family History





- Family History


Family Medical History: No Pertinent Family History


Cardiac: Reports: MI


Other Cardiac Family History: Father had heart attack in 2013 with stents 

placed.





- Tobacco Use


Tobacco Use Status *Q: Current Every Day Tobacco User


Years of Tobacco use: 20


Packs/Tins Daily: 0.5





- Caffeine Use


Caffeine Use: Reports: None


Caffeine Use Comment: 3 cups daily





- Recreational Drug Use


Recreational Drug Use: No





- Living Situation & Occupation


Living situation: Reports: 


Occupation: Employed





Review of Systems





- Review of Systems


Review Of Systems: Comprehensive ROS is negative, except as noted in HPI.





ED EXAM, GENERAL





- Physical Exam


Exam: See Below


Free Text/Narrative:: 





My physical exam is in the HPI





Course





- Vital Signs


Text/Narrative:: 





X-rays negative for fracture or dislocation.





Neurovascular integrity of structures documented after the wrist splint applied.


Last Recorded V/S: 


                                Last Vital Signs











Temp  36.9 C   11/16/20 19:07


 


Pulse  81   11/16/20 20:00


 


Resp  16   11/16/20 20:00


 


BP  114/70   11/16/20 20:00


 


Pulse Ox  97   11/16/20 20:00














- Orders/Labs/Meds


Orders: 


                               Active Orders 24 hr











 Category Date Time Status


 


 DME for Discharge [COMM] Stat Oth  11/16/20 19:34 Ordered











Meds: 


Medications














Discontinued Medications














Generic Name Dose Route Start Last Admin





  Trade Name Tim  PRN Reason Stop Dose Admin


 


Methylprednisolone  24 mg  11/16/20 19:27  11/16/20 19:45





  Medrol  PO  11/16/20 19:28  Not Given





  ONETIME ONE  





  Protocol  


 


Prednisone  40 mg  11/16/20 19:43  11/16/20 19:46





  Prednisone  PO  11/16/20 19:44  40 mg





  ONETIME ONE   Administration


 


Prednisone  Confirm  11/16/20 19:44  11/16/20 19:47





  Prednisone  Administered  11/16/20 19:45  Not Given





  Dose  





  40 mg  





  .ROUTE  





  .STK-MED ONE  














Departure





- Departure


Time of Disposition: 20:17


Disposition: Home, Self-Care 01


Condition: Good


Clinical Impression: 


 Wrist strain, Neuropraxia of left upper extremity








- Discharge Information


Prescriptions: 


methylPREDNISolone [Medrol Dose Pack] 4 mg PO DAILY #21 tab


Instructions:  Neurapraxia


Referrals: 


PCP,None [Primary Care Provider] - 


Forms:  ED Department Discharge


Additional Instructions: 


Family doctor or primary care can release you to normal activity you have 

removal of the splint after a few days of steroids results in normal function of

the wrist.  Otherwise you need to see the orthopedist and perhaps be referred to

a neurologist.





You have strained and overused your wrist.  This is resulted in a temporary 

weakness in the radial nerve distribution.  It is unlikely with your mechanism 

that we did anything to disrupt the nerve or trap it acutely between structures 

that would require surgery.





[facility]


[address]


[address] [place] [address], [address]


[place] [address]


Phone: [phone]


Fax: [phone]





The following information is given to patients seen in the emergency department 

who are being discharged to home. This information is to outline your options 

for follow-up care. We provide all patients seen in our emergency department 

with a follow-up referral.





The need for follow-up, as well as the timing and circumstances, are variable 

depending upon the specifics of your emergency department visit.





If you don't have a primary care physician on staff, we will provide you with a 

referral. We always advise you to contact your personal physician following an 

emergency department visit to inform them of the circumstance of the visit and 

for follow-up with them and/or the need for any referrals to a consulting 

specialist.





The emergency department will also refer you to a specialist when appropriate. 

This referral assures that you have the opportunity for follow-up care with a 

specialist. All of these measure are taken in an effort to provide you with 

optimal care, which includes your follow-up.





Under all circumstances we always encourage you to contact your private 

physician who remains a resource for coordinating your care. When calling for 

follow-up care, please make the office aware that this follow-up is from your 

recent emergency room visit. If for any reason you are refused follow-up, please

contact the [facility] Emergency Department

at [phone] and asked to speak to the emergency department charge nurse.








Sepsis Event Note (ED)





- Evaluation


Sepsis Screening Result: No Definite Risk





- Focused Exam


Vital Signs: 


                                   Vital Signs











  Temp Pulse Resp BP Pulse Ox


 


 11/16/20 20:00   81  16  114/70  97


 


 11/16/20 19:07  36.9 C  93  17  131/73  98














- My Orders


Last 24 Hours: 


My Active Orders





11/16/20 19:34


DME for Discharge [COMM] Stat 














- Assessment/Plan


Last 24 Hours: 


My Active Orders





11/16/20 19:34


DME for Discharge [COMM] Stat
